# Patient Record
Sex: FEMALE | Race: AMERICAN INDIAN OR ALASKA NATIVE
[De-identification: names, ages, dates, MRNs, and addresses within clinical notes are randomized per-mention and may not be internally consistent; named-entity substitution may affect disease eponyms.]

---

## 2019-04-06 ENCOUNTER — HOSPITAL ENCOUNTER (EMERGENCY)
Dept: HOSPITAL 5 - ED | Age: 59
Discharge: HOME | End: 2019-04-06
Payer: COMMERCIAL

## 2019-04-06 VITALS — SYSTOLIC BLOOD PRESSURE: 122 MMHG | DIASTOLIC BLOOD PRESSURE: 70 MMHG

## 2019-04-06 DIAGNOSIS — I10: ICD-10-CM

## 2019-04-06 DIAGNOSIS — B34.9: Primary | ICD-10-CM

## 2019-04-06 PROCEDURE — 71046 X-RAY EXAM CHEST 2 VIEWS: CPT

## 2019-04-06 NOTE — XRAY REPORT
PROCEDURE:  XR CHEST ROUTINE 2V 

 

TECHNIQUE:  PA and lateral chest radiographs were obtained.  

 

HISTORY: cough/vince 

 

COMPARISONS:  None. 

 

FINDINGS: 

 

Heart:  Normal. 

Mediastinum/Vessels: Normal. 

Lungs/Pleural space:  Lungs are expanded. There are no infiltrates, effusions or pneumothoraces.. 

Bony thorax: No acute osseous abnormality. 

 

IMPRESSION:   

 

Heart size is normal.. 

 

Lungs are expanded. There are no infiltrates, effusions or pneumothoraces.. 

 

This document is electronically signed by Obinna Jarrett MD., April 6 2019 02:50:42 AM ET